# Patient Record
(demographics unavailable — no encounter records)

---

## 2025-01-27 NOTE — HISTORY OF PRESENT ILLNESS
[FreeTextEntry1] : s/p uti//will repeat culture post antibiotics....debby had an ovarian cyst on the left  here for 6 mth fu feels well Normal vision: sees adequately in most situations; can see medication labels, newsprint

## 2025-01-27 NOTE — PROCEDURE
[Transvaginal Ultrasound] : transvaginal ultrasound [FreeTextEntry3] : cyst doubled in size diverticular abscess?? need ct scan to clarify markers to r/o ovarian etiology [FreeTextEntry5] : 28 cc vol, 1.3 mm [FreeTextEntry7] : 1.2 cc [FreeTextEntry8] : 14.9 cc vol...cyst

## 2025-01-27 NOTE — PHYSICAL EXAM
[Chaperone Present] : A chaperone was present in the examining room during all aspects of the physical examination [01882] : A chaperone was present during the pelvic exam. [FreeTextEntry2] : isac [Labia Majora] : normal [Labia Minora] : normal [Normal] : normal [Uterine Adnexae] : normal